# Patient Record
Sex: FEMALE | Race: WHITE | NOT HISPANIC OR LATINO | Employment: FULL TIME | URBAN - METROPOLITAN AREA
[De-identification: names, ages, dates, MRNs, and addresses within clinical notes are randomized per-mention and may not be internally consistent; named-entity substitution may affect disease eponyms.]

---

## 2024-01-11 ENCOUNTER — TELEMEDICINE (OUTPATIENT)
Dept: BEHAVIORAL HEALTH | Facility: CLINIC | Age: 39
End: 2024-01-11
Payer: COMMERCIAL

## 2024-01-11 DIAGNOSIS — F41.1 GAD (GENERALIZED ANXIETY DISORDER): ICD-10-CM

## 2024-01-11 PROCEDURE — 99213 OFFICE O/P EST LOW 20 MIN: CPT | Performed by: CLINICAL NURSE SPECIALIST

## 2024-01-11 RX ORDER — SERTRALINE HYDROCHLORIDE 100 MG/1
150 TABLET, FILM COATED ORAL DAILY
Qty: 135 TABLET | Refills: 1 | Status: SHIPPED | OUTPATIENT
Start: 2024-01-11 | End: 2025-01-10

## 2024-01-11 NOTE — PROGRESS NOTES
Subjective   Patient ID: Trisha Stanton is a 38 y.o. 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 Lives w. , son Mitesh and baby, Thony.. Medications: Sertraline. Delivered 2 weeks early via induced  on 21 d/t HTN for baby boy, Thony .    HPI  Review of Systems   All other systems reviewed and are negative.    Objective   Physical Exam  Psychiatric:         Attention and Perception: Attention normal.         Mood and Affect: Mood is anxious.         Speech: Speech normal.         Behavior: Behavior normal. Behavior is cooperative.         Thought Content: Thought content normal.         Cognition and Memory: Cognition and memory normal.         Judgment: Judgment normal.       INTERIM: Now at 2 yo postpartum. Reports no new medical issues, medication. No longer seeing ,therapist. Doing two jobs at [employer] for now and awaiting new role . Mother of  2 and 5 yo. Boys, frequently sick  not serious, but take toll. Mood overall stable, denies depression, managing stressors , remaining positive.  ANXIETY varies, but managing . Continues on Sertraline 150mg po qam and feels effective. Some daytime fatigue. Sleep varies as toddler frequently up during night, pt working on this. Exercising during week, eating.        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 Lives w. , son Mitesh and baby, Thony.. Medications: Sertraline. Delivered 2 weeks early via induced  on 21. Continues on Sertraline 150 mg w/ effect. Some fatigue . Discussed review with PCP for annual physical ie TSH, VITS levels, as well la lifestyle factors exercise nutrition, ie adequate protein throughout day.   We  lre evaluate trial of Wellbutrin as indicated.     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 Fourteen month pp   Features PTSD After  Pregnancy Losses  Hx Postpartum Depression after 1 yo son born     Treatment   Continue Sertraline 100mg Take 1.5 tabs po qam renewed 90 1RF   Pt will inform me if desire to consider trial of Wellbutrin   RTC in 6  months   Contact provider as needed  via [email or web address]

## 2024-06-13 ENCOUNTER — APPOINTMENT (OUTPATIENT)
Dept: BEHAVIORAL HEALTH | Facility: CLINIC | Age: 39
End: 2024-06-13
Payer: COMMERCIAL

## 2024-09-09 DIAGNOSIS — F41.1 GAD (GENERALIZED ANXIETY DISORDER): ICD-10-CM

## 2024-09-09 RX ORDER — SERTRALINE HYDROCHLORIDE 100 MG/1
150 TABLET, FILM COATED ORAL DAILY
Qty: 45 TABLET | Refills: 1 | Status: SHIPPED | OUTPATIENT
Start: 2024-09-09 | End: 2024-11-08

## 2024-10-31 ENCOUNTER — APPOINTMENT (OUTPATIENT)
Dept: BEHAVIORAL HEALTH | Facility: CLINIC | Age: 39
End: 2024-10-31
Payer: COMMERCIAL

## 2024-11-22 DIAGNOSIS — F41.1 GAD (GENERALIZED ANXIETY DISORDER): ICD-10-CM

## 2024-11-22 RX ORDER — SERTRALINE HYDROCHLORIDE 100 MG/1
150 TABLET, FILM COATED ORAL DAILY
Qty: 45 TABLET | Refills: 1 | OUTPATIENT
Start: 2024-11-22

## 2024-12-09 ENCOUNTER — TELEPHONE (OUTPATIENT)
Dept: OTHER | Age: 39
End: 2024-12-09
Payer: COMMERCIAL

## 2024-12-19 ENCOUNTER — APPOINTMENT (OUTPATIENT)
Dept: BEHAVIORAL HEALTH | Facility: CLINIC | Age: 39
End: 2024-12-19
Payer: COMMERCIAL

## 2024-12-19 DIAGNOSIS — F41.1 GAD (GENERALIZED ANXIETY DISORDER): ICD-10-CM

## 2024-12-19 PROCEDURE — 99213 OFFICE O/P EST LOW 20 MIN: CPT | Performed by: CLINICAL NURSE SPECIALIST

## 2024-12-19 RX ORDER — SERTRALINE HYDROCHLORIDE 100 MG/1
150 TABLET, FILM COATED ORAL DAILY
Qty: 135 TABLET | Refills: 1 | Status: SHIPPED | OUTPATIENT
Start: 2024-12-19 | End: 2025-06-17

## 2024-12-19 NOTE — PROGRESS NOTES
Subjective   Patient ID: Trisha Stanton is a 39 y.o. female who presents for management of ASTER.    HPI  Review of Systems   All other systems reviewed and are negative.     Psych Review of Symptoms  Objective   Physical Exam  Psychiatric:         Attention and Perception: Attention and perception normal.         Mood and Affect: Mood and affect normal. Mood is not anxious.         Speech: Speech normal.         Behavior: Behavior normal. Behavior is cooperative.         Thought Content: Thought content normal.         Cognition and Memory: Cognition and memory normal.         Judgment: Judgment normal.     Assessment/Plan   IMP 40 yo MF w/ history of MDE, Peripartum ASTER, Features PTSD due to Multiple Pregnancy Losses. Lives w/ , sons Mitesh and  Thony.. Medications: Sertraline. Delivered early via induced  on 21. 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        INTERVAL 2024: Presents at 2 yr pp. Thony is 2yo and Mitesh is 6yo still breastfeeding younger son several times per week and plans weaning. New medical issues include 1) treatment for TMJ affecting sleep, and experience a second DVT in Left Calf and treated w/ Lovenox, no future need for continued anticoagulants. ANXIETY: Remains on Sertraline 150mg po qam w/ benefit. Endorsing some obsessive thinking fixation on blinking, swallowing etc. occurs daily and less than previously, wax and wanes. No affect of LOF but some diminish enjoyment. Not using oral contraceptives,  and no consideration of future pregnancy. Mood most of time pretty good, no social isolation, anhedonia. Looking forward to hosting large family gathering.              Diagnoses   MDE, Rec in Remission   ASTER in Partal remission   Three yr pp.   Features PTSD After Pregnancy Losses  Hx Postpartum Depression      Treatment   Continue Sertraline 100mg Take 1.5 tabs po qam renewed 90 1RF   Pt will inform me if desire to consider trial of Wellbutrin    RTC in 6  months   Contact provider as needed  via [employer]    Consider referral for ind therapy to address anxiety as complement to medication

## 2025-06-05 ENCOUNTER — APPOINTMENT (OUTPATIENT)
Dept: BEHAVIORAL HEALTH | Facility: CLINIC | Age: 40
End: 2025-06-05
Payer: COMMERCIAL

## 2025-06-05 DIAGNOSIS — F41.1 GAD (GENERALIZED ANXIETY DISORDER): ICD-10-CM

## 2025-06-05 PROCEDURE — 99213 OFFICE O/P EST LOW 20 MIN: CPT | Performed by: CLINICAL NURSE SPECIALIST

## 2025-06-05 RX ORDER — SERTRALINE HYDROCHLORIDE 100 MG/1
150 TABLET, FILM COATED ORAL DAILY
Qty: 135 TABLET | Refills: 1 | Status: SHIPPED | OUTPATIENT
Start: 2025-06-05 | End: 2025-12-02

## 2025-06-05 NOTE — PROGRESS NOTES
Subjective   Patient ID: Trisha Stanton is a 39 y.o. female who presents for follow up.   HPI  Review of Systems   All other systems reviewed and are negative.   Psych Review of Symptoms  Objective   Physical Exam  Psychiatric:         Attention and Perception: Attention and perception normal.         Mood and Affect: Mood and affect normal.         Speech: Speech normal.         Behavior: Behavior normal. Behavior is cooperative.         Cognition and Memory: Cognition normal.         Judgment: Judgment normal.       Assessment/Plan   IMP 40 yo MF w/ history of MDE, Peripartum ASTER, Features PTSD due to Multiple Pregnancy Losses. Lives w/ , sons Mitesh and  Thony.. Medications: Sertraline. Delivered early via induced  on 21. 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        INTERVAL 2024: Presents at 2 yr pp. Thony is 2yo and Mitesh is 8yo still breastfeeding younger son several times per week and plans weaning. New medical issues include 1) treatment for TMJ affecting sleep, and experience a second DVT in Left Calf and treated w/ Lovenox, no future need for continued anticoagulants. ANXIETY: Remains on Sertraline 150mg po qam w/ benefit. Endorsing some obsessive thinking fixation on blinking, swallowing etc. occurs daily and less than previously, wax and wanes. No affect of LOF but some diminish enjoyment. Not using oral contraceptives,  and no consideration of future pregnancy. Mood most of time pretty good, no social isolation, anhedonia. Looking forward to hosting large family gathering.       INTERVAL: Six month follow up : Mother of 3 and 8 yo. And working FT in administrative role. Stopped breastfeeding 4 months ago after long weaning and 3 yr duration. Increased anxiety with some work stress, but otherwise mood and anxiety, well controlled on Sertraline 150mg po qam. Sleep at baseline. Optimistic toward future.             Diagnoses   MDE, Rec in Remission   ASTER  in Partal remission   Three yr pp.   Features PTSD After Pregnancy Losses  Hx Postpartum Depression      Treatment   Continue Sertraline 100mg Take 1.5 tabs po qam renewed 90 1RF   Pt will inform me if desire to consider trial of Wellbutrin   RTC in 6  months   Contact provider as needed  via [employer]    Consider referral for ind therapy to address anxiety as complement to medication

## 2025-06-25 ENCOUNTER — APPOINTMENT (OUTPATIENT)
Dept: OTOLARYNGOLOGY | Facility: CLINIC | Age: 40
End: 2025-06-25
Payer: COMMERCIAL

## 2025-06-30 NOTE — PROGRESS NOTES
Patient ID: Trisha Stanton is a 39 y.o. female who presents for No chief complaint on file..      HPI   This 39-year-old female is being seen in the office today primarily for an evaluation regarding hearing.  She does have a significant history of allergic rhinitis refractory to medications.  As a result of her allergies she does have turbinate hypertrophy issues although there is been no history of polyps.  Immunotherapy through allergy is being started.  She has had some congestion in her ears and is scheduled for an audiogram.  She was seen by others and treated in mid June for sinobronchitis on Augmentin, fluticasone, Naprosyn and cough suppressant.  This infection seem to occur around the time of an airflight which influenced her ear.  Even though she is improved in regards to her hearing she continues to have some feeling of pressure in her right ear unassociated with discomfort.  There is also been some very momentary bouts of instability without a tendency for falling.  This can last for seconds and if more intense up to 15 minutes.  ROS   A 12 point ROS  has been reviewed and are negative for complaint except for what is stated in the history of present illness and /or for past medical history as noted in the EMR.     Medical History[1]    Current Medications[2]    Surgical History[3]    Family History[4]    Social History[5]    RX Allergies[6]     vitals were not taken for this visit.       PHYSICAL EXAM  Examination:    CONSTITUTIONAL: Alert, in no acute distress, normal pitch/clarity of voice, well-developed, well-nourished, cooperative.  HEAD/FACE: Normocephalic, atraumatic, no tenderness over the sinuses, facial strength and movement symmetric.    SKIN: Good turgor, no rashes, no suspicious lesions, in the head and neck.    EYES: Both eyes have normal extraocular movements with no nystagmus, pupils are equal and reactive to light and accommodation, conjunctiva is clear.    EARS: Both ears are  negative for external skin abnormalities, external auditory canals are without lesions or signs of inflammation, tympanic membranes are intact and are of normal color and texture, no effusions are seen, light reflexes normal, no mastoid tenderness is noted to palpation, objective hearing is intact.    NOSE: No external skin lesions are noted, nares are patent, septum is intact and deviated to the left and noninflamed, nasal turbinates are normal in appearance, no purulent nasal discharge was noted by anterior rhinoscopy, sinuses are nontender to palpation bilaterally, no internal lesions or polyps are noted, no discharge is noted.    OROPHARYNX/ORAL CAVITY: Mucous membranes of the oropharynx and the oral cavity proper are without lesions or ulcerations, tongue mobility is normal and no lesions are noted, gingiva and alveolar mucosa is intact without lesions, oral mucosa is moist, muscular movement of the palate and gag reflex are normal.    NECK: No lymphadenopathy is palpated, neck is supple with full range of motion, thyroid is without swelling or tenderness, trachea is midline, no neck masses are noted.    Lymphatics: No cervical adenopathy or supraclavicular adenopathy noted to palpation.    HEART/VASCULAR: No jugular venous distention is noted, carotid pulsations are intact with a regular rate and rhythm noted,    PULMONARY: Good air movement with normal inspiratory/expiratory effort is noted, no audible wheezing is appreciated.    NEUROLOGIC: Alert and oriented, cranial nerves are grossly intact, gait is normal, sensation in the head and neck is intact,    PSYCH: oriented to person, place and time, normal mood and affect.    EXTREMITIES: No motor dysfunction of the upper and lower extremity is noted.    Her audiogram today in the left ear revealed upper normal hearing at all frequencies.  On the right-hand side there was a mild conductive hearing loss at 125 and 200 50 Hz.  Normal hearing was noted in the  remaining frequencies.  Speech audiogram revealed 100% squamation ability in both ears at 50 dB.  SRT levels was a 10 on the right 5 on the left.  Tympanograms were normal stapedial reflexes were normal.    ASSESSMENT/PLAN  Problem List Items Addressed This Visit    None  Visit Diagnoses         Codes      Conductive hearing loss of right ear with unrestricted hearing of left ear    -  Primary H90.11      Nasal septal deviation     J34.2      History of allergic rhinitis     Z87.09      Barotrauma, initial encounter     T70.29XA      Dizziness and giddiness     R42                 I discussed the clinical findings with the patient.  She had no symptoms of this nature prior to 14 June.  She had just returned from an airflight with an upper respiratory tract infection.  She was seen in the emergency room and treated with antibiotics at that time.  Symptoms have been slowly improving.  The audiogram shows a very isolated conductive loss in the low tones with a normal tympanogram which would suggest either that there was still resolution of an effusion previously noted in the ER not yet completely compensated for.  If associate this should continue to resolve possibly helped by use of a Medrol Dosepak and steroid nasal spray.  There can be other causes for conductive loss in the face of normal eustachian tube function which can also be associated with instability.  As such I informed her that if symptoms are persisting she needs to have this rechecked and if there is a continued conductive loss there may be a need for evaluation by making studies of her temporal bone.  For now a Medrol Dosepak along with a topical steroid spray was advised.  She chose to wait to see if symptoms resolve as opposed to having a follow-up scheduled and she will reach out to the office if symptoms are persisting.    This patient is advised to follow up with their PCP for all other health care issues and treatment. Dictation was done with  dragon transcription and errors in spelling  and diction are possible.            [1]   Past Medical History:  Diagnosis Date    Anxiety    [2]   Current Outpatient Medications:     albuterol 90 mcg/actuation inhaler, Inhale 2 puffs every 6 hours if needed., Disp: , Rfl:     brompheniramine-pseudoeph-DM 2-30-10 mg/5 mL syrup, Take 10 mL by mouth., Disp: , Rfl:     ferrous sulfate 325 mg (65 mg elemental) tablet, Take 1 tablet by mouth every other day., Disp: , Rfl:     fexofenadine (Allegra) 60 mg tablet, Take 1 tablet (60 mg) by mouth once daily., Disp: , Rfl:     fluticasone (Flonase) 50 mcg/actuation nasal spray, Administer 1 spray into affected nostril(s) once daily. (Patient not taking: Reported on 7/3/2025), Disp: , Rfl:     naproxen (Naprosyn) 250 mg tablet, Take 1 tablet (250 mg) by mouth 2 times a day as needed., Disp: , Rfl:     sertraline (Zoloft) 100 mg tablet, Take 1.5 tablets (150 mg) by mouth once daily., Disp: 135 tablet, Rfl: 1    sertraline (Zoloft) 100 mg tablet, Take 1.5 tablets (150 mg) by mouth once daily., Disp: 135 tablet, Rfl: 1  [3] History reviewed. No pertinent surgical history.  [4]   Family History  Problem Relation Name Age of Onset    Anxiety disorder Father Father    [5]   Social History  Tobacco Use    Smoking status: Never    Smokeless tobacco: Never   Substance Use Topics    Alcohol use: Not Currently    Drug use: Never   [6] No Known Allergies

## 2025-07-03 ENCOUNTER — APPOINTMENT (OUTPATIENT)
Dept: OTOLARYNGOLOGY | Facility: CLINIC | Age: 40
End: 2025-07-03
Payer: COMMERCIAL

## 2025-07-03 ENCOUNTER — APPOINTMENT (OUTPATIENT)
Dept: AUDIOLOGY | Facility: CLINIC | Age: 40
End: 2025-07-03
Payer: COMMERCIAL

## 2025-07-03 DIAGNOSIS — J34.2 NASAL SEPTAL DEVIATION: ICD-10-CM

## 2025-07-03 DIAGNOSIS — H92.01 RIGHT EAR PAIN: ICD-10-CM

## 2025-07-03 DIAGNOSIS — T70.29XA BAROTRAUMA, INITIAL ENCOUNTER: ICD-10-CM

## 2025-07-03 DIAGNOSIS — Z87.09 HISTORY OF ALLERGIC RHINITIS: ICD-10-CM

## 2025-07-03 DIAGNOSIS — H90.11 CONDUCTIVE HEARING LOSS OF RIGHT EAR WITH UNRESTRICTED HEARING OF LEFT EAR: Primary | ICD-10-CM

## 2025-07-03 DIAGNOSIS — R42 DIZZINESS AND GIDDINESS: ICD-10-CM

## 2025-07-03 DIAGNOSIS — H93.13 TINNITUS, BILATERAL: ICD-10-CM

## 2025-07-03 PROCEDURE — 99204 OFFICE O/P NEW MOD 45 MIN: CPT | Performed by: OTOLARYNGOLOGY

## 2025-07-03 PROCEDURE — 1036F TOBACCO NON-USER: CPT | Performed by: OTOLARYNGOLOGY

## 2025-07-03 PROCEDURE — 92550 TYMPANOMETRY & REFLEX THRESH: CPT | Performed by: AUDIOLOGIST

## 2025-07-03 PROCEDURE — 92557 COMPREHENSIVE HEARING TEST: CPT | Performed by: AUDIOLOGIST

## 2025-07-03 RX ORDER — BROMPHENIRAMINE MALEATE, PSEUDOEPHEDRINE HYDROCHLORIDE, AND DEXTROMETHORPHAN HYDROBROMIDE 2; 30; 10 MG/5ML; MG/5ML; MG/5ML
10 SYRUP ORAL
COMMUNITY
Start: 2025-06-15

## 2025-07-03 RX ORDER — FERROUS SULFATE 325(65) MG
1 TABLET ORAL
COMMUNITY
Start: 2021-11-27

## 2025-07-03 RX ORDER — NAPROXEN 250 MG/1
250 TABLET ORAL 2 TIMES DAILY PRN
COMMUNITY
Start: 2025-06-15

## 2025-07-03 RX ORDER — FLUTICASONE PROPIONATE 50 MCG
SPRAY, SUSPENSION (ML) NASAL
Qty: 9.9 ML | Refills: 3 | Status: SHIPPED | OUTPATIENT
Start: 2025-07-03

## 2025-07-03 RX ORDER — METHYLPREDNISOLONE 4 MG/1
TABLET ORAL
Qty: 21 TABLET | Refills: 0 | Status: SHIPPED | OUTPATIENT
Start: 2025-07-04 | End: 2025-07-10

## 2025-07-03 RX ORDER — FLUTICASONE PROPIONATE 50 MCG
1 SPRAY, SUSPENSION (ML) NASAL
COMMUNITY
Start: 2021-01-05

## 2025-07-03 RX ORDER — FEXOFENADINE HCL 60 MG/1
60 TABLET, FILM COATED ORAL
COMMUNITY

## 2025-07-03 RX ORDER — ALBUTEROL SULFATE 90 UG/1
2 INHALANT RESPIRATORY (INHALATION) EVERY 6 HOURS PRN
COMMUNITY

## 2025-07-03 NOTE — PROGRESS NOTES
"COMPREHENSIVE AUDIOMETRIC EVALUATION      Name:  Trisha Stanton  :  1985  Age:  39 y.o.  Date of Evaluation:  25   Referring Provider:  Duke Acharya DMD, MD     History:  Ms. Stanton was seen today for an evaluation of hearing.  Patient reported starting  she experienced right aural fullness, decreased right hearing, right otalgia, intermittent bilateral tinnitus, and dizziness.  Patient noted her dizziness is \"in and out\"  and will last for 15 minutes in duration, daily. When asked, patient denied left otologic involvement.    See audiometric evaluation at end of this report or scanned under media tab    OTOSCOPY:       Right Ear: Clear canal       Left Ear: Clear canal    226 Hz TYMPANOMETRY:       Right Ear: Type A: normal peak pressure, compliance, and ear canal volume, consistent with middle ear function within normal limits       Left Ear: Type A: normal peak pressure, compliance, and ear canal volume, consistent with middle ear function within normal limits    AUDIOMETRIC EVALUATION (Phones, checked with inserts):       Right Ear: Mild at 250 Hz rising to Normal, Conductive hearing loss                 Left Ear:  - 8000 Hz          Test technique:  Standard Audiometry  Reliability:   good    SPEECH RECOGNITION THRESHOLD:       Right Ear:  10 dBHL in good agreement with PTA       Left Ear:  5 dBHL in good agreement with PTA    WORD RECOGNITION:       Right Ear:  100%  excellent (%) at normal presentation level       Left Ear:   100%  excellent (%) at normal presentation level    IPSILATERAL ACOUSTIC REFLEXES:       Right Ear:  -2000 Hz       Left Ear:    -2000 Hz    DISCUSSION:   Discussed results and recommendations with patient.  Questions were addressed and the patient was encouraged to contact our department should concerns arise.    RECOMMENDATIONS:  -Recommend patient return for repeated audiometric evaluation should concerns for changes in " hearing sensitivity arise or as medically indicated.  -Recommend patient continue with scheduled ENT follow up with Duke Acharya DMD, MD Alexandra Dickinson, B.A.  [facility] Audiology Student      Janet Oh, CCC-A     Appt: 8:00 - 8:30 AM

## 2025-08-28 ENCOUNTER — APPOINTMENT (OUTPATIENT)
Dept: BEHAVIORAL HEALTH | Facility: CLINIC | Age: 40
End: 2025-08-28
Payer: COMMERCIAL

## 2025-08-28 DIAGNOSIS — F41.1 GAD (GENERALIZED ANXIETY DISORDER): ICD-10-CM

## 2025-08-28 PROCEDURE — 99213 OFFICE O/P EST LOW 20 MIN: CPT | Performed by: CLINICAL NURSE SPECIALIST

## 2025-08-28 RX ORDER — SERTRALINE HYDROCHLORIDE 100 MG/1
150 TABLET, FILM COATED ORAL DAILY
Qty: 135 TABLET | Refills: 1 | Status: SHIPPED | OUTPATIENT
Start: 2025-08-28 | End: 2026-02-24

## 2025-11-18 ENCOUNTER — APPOINTMENT (OUTPATIENT)
Dept: BEHAVIORAL HEALTH | Facility: CLINIC | Age: 40
End: 2025-11-18
Payer: COMMERCIAL